# Patient Record
Sex: FEMALE | Race: BLACK OR AFRICAN AMERICAN | Employment: UNEMPLOYED | URBAN - METROPOLITAN AREA
[De-identification: names, ages, dates, MRNs, and addresses within clinical notes are randomized per-mention and may not be internally consistent; named-entity substitution may affect disease eponyms.]

---

## 2019-02-12 ENCOUNTER — OP HISTORICAL/CONVERTED ENCOUNTER (OUTPATIENT)
Dept: OTHER | Age: 34
End: 2019-02-12

## 2019-03-05 ENCOUNTER — OP HISTORICAL/CONVERTED ENCOUNTER (OUTPATIENT)
Dept: OTHER | Age: 34
End: 2019-03-05

## 2019-03-19 ENCOUNTER — OP HISTORICAL/CONVERTED ENCOUNTER (OUTPATIENT)
Dept: OTHER | Age: 34
End: 2019-03-19

## 2019-07-02 ENCOUNTER — OP HISTORICAL/CONVERTED ENCOUNTER (OUTPATIENT)
Dept: OTHER | Age: 34
End: 2019-07-02

## 2020-08-24 VITALS
WEIGHT: 260 LBS | HEIGHT: 64 IN | HEART RATE: 66 BPM | SYSTOLIC BLOOD PRESSURE: 120 MMHG | BODY MASS INDEX: 44.39 KG/M2 | DIASTOLIC BLOOD PRESSURE: 60 MMHG

## 2020-08-24 PROBLEM — E83.51 HYPOCALCEMIA: Status: ACTIVE | Noted: 2020-08-24

## 2020-08-24 PROBLEM — C73 MALIGNANT NEOPLASM OF THYROID GLAND (HCC): Status: ACTIVE | Noted: 2020-08-24

## 2020-08-24 PROBLEM — E89.0 POSTSURGICAL HYPOTHYROIDISM: Status: ACTIVE | Noted: 2020-08-24

## 2020-12-04 RX ORDER — LEVOTHYROXINE SODIUM 175 UG/1
TABLET ORAL
Qty: 30 TAB | Refills: 0 | Status: SHIPPED | OUTPATIENT
Start: 2020-12-04 | End: 2020-12-22

## 2021-01-20 ENCOUNTER — OFFICE VISIT (OUTPATIENT)
Dept: ENDOCRINOLOGY | Age: 36
End: 2021-01-20
Payer: MEDICAID

## 2021-01-20 VITALS
BODY MASS INDEX: 43.06 KG/M2 | HEIGHT: 64 IN | OXYGEN SATURATION: 97 % | TEMPERATURE: 98.7 F | HEART RATE: 81 BPM | DIASTOLIC BLOOD PRESSURE: 69 MMHG | WEIGHT: 252.2 LBS | SYSTOLIC BLOOD PRESSURE: 115 MMHG

## 2021-01-20 DIAGNOSIS — C73 PAPILLARY THYROID CARCINOMA (HCC): Primary | ICD-10-CM

## 2021-01-20 DIAGNOSIS — E55.9 VITAMIN D DEFICIENCY: ICD-10-CM

## 2021-01-20 DIAGNOSIS — E83.51 HYPOCALCEMIA: ICD-10-CM

## 2021-01-20 DIAGNOSIS — E89.0 POSTOPERATIVE HYPOTHYROIDISM: ICD-10-CM

## 2021-01-20 PROCEDURE — 99214 OFFICE O/P EST MOD 30 MIN: CPT | Performed by: INTERNAL MEDICINE

## 2021-01-20 NOTE — LETTER
1/20/2021 Patient: Pedro Abreu YOB: 1985 Date of Visit: 1/20/2021 Rahel Mariano MD 
[address] [place] [address],[phone] Via Fax: [phone] Dear Rahel Mariano MD, Thank you for referring Ms. Saadia Guy to [address] [place] [address] for evaluation. My notes for this consultation are attached. If you have questions, please do not hesitate to call me. I look forward to following your patient along with you. Sincerely, Marina Raymond MD

## 2021-01-20 NOTE — PROGRESS NOTES
History and Physical    Patient: Jennifer Chavez MRN: [id number]  SSN: [id number]    YOB: 1985  Age: 39 y.o. Sex: female      Subjective:      Jennifer Chavez is a 39 y.o. female with no significant past medical history is here for follow-up of papillary thyroid carcinoma. She remains in primary care of Dr. Abigail Estevez. She is also in care of ENT surgeon Dr. Josh Cruz.    Was last seen in December 2019. papillary thyroid carcinoma: At the previous visit it was discussed based on patient's lab that she has biochemical incomplete response. We planned to continue with monitoring thyroglobulin panel every 6 months while keeping TSH suppressed. Thyroglobulin panel was checked in December 2019 and although thyroglobulin was still detectable it was trending down. Last neck ultrasound was in March 2020 and it did not show any remanence in thyroid fossa. Today patient denies any difficulty in swallowing, hoarseness of voice. Sometimes she feels a small bump on the left side of her neck in thyroid region. Postoperative hypothyroidism:  Currently on levothyroxine 175 µg daily. She is taking this regularly and appropriately. She is complaining of cold intolerance, excessive sweating, fatigue, sometimes she gets palpitations and tremors. she feels like she has difficulty in losing weight. She had anxiety attack a few months back, which was new for her. Menses are regular but very heavy. Hypocalcemia:  Combination of vitamin D deficiency and some postoperative hypoparathyroidism. At the last visit patient was given high dose vitamin D replacement. She took 50,000 units once a week for 12 weeks and 2000 units once a day until one month back and then she stopped taking it. She was advised to take 2 calcium tablets every day but she is not taking this because it upsets her stomach.   She is describing symptoms of hypocalcemia including tingling in her fingertips, fatigue and muscle spasms. Initial history:  Patient noticed a lump on her thyroid a few months back. She went to primary care physician who did imaging that showed thyroid nodules. Following this she was referred to ENT. Patient had CT scan that showed multinodular goiter with compression of trachea. Patient did not have biopsy of thyroid nodules and she was taken straight for thyroidectomy. She had surgery on 3192019. she tolerated the surgery well. pathology came back showing A thyroid cancer, so patient is sent to me for further management. Family history of breast cancer in patient's aunt. Family history of thyroidectomy in patient's aunt, not clear if it was for cancer or just goiter. Past Medical History:   Diagnosis Date    Hypocalcemia 8/24/2020    Malignant neoplasm of thyroid gland ([address] 8/24/2020    Postsurgical hypothyroidism 8/24/2020     Past Surgical History:   Procedure Laterality Date    HX THYROIDECTOMY        Family History   Problem Relation Age of Onset    Lupus Mother     No Known Problems Father      Social History     Tobacco Use    Smoking status: Current Every Day Smoker     Packs/day: 1.00     Years: 0.00     Pack years: 0.00    Smokeless tobacco: Never Used   Substance Use Topics    Alcohol use: Yes      Prior to Admission medications    Medication Sig Start Date End Date Taking? Authorizing Provider   levothyroxine (SYNTHROID) 175 mcg tablet TAKE 1 TABLET BY MOUTH EVERY DAY IN THE MORNING 12/22/20  Yes Khris Ramey MD        No Known Allergies    Review of Systems:  ROS    A comprehensive review of systems was preformed and it is negative except mentioned in HPI    Objective:     Vitals:    01/20/21 1105   BP: 115/69   Pulse: 81   Temp: 98.7 °F (37.1 °C)   TempSrc: Temporal   SpO2: 97%   Weight: 252 lb 3.2 oz (114.4 kg)   Height: 5' 4\" (1.626 m)        Physical Exam:    Physical Exam  Constitutional:       Appearance: She is obese. HENT:      Head: Normocephalic and atraumatic. Neck:      Musculoskeletal: Normal range of motion. Comments: I did not feel any enlarged lymph nodes in her neck  Cardiovascular:      Rate and Rhythm: Normal rate and regular rhythm. Pulmonary:      Effort: Pulmonary effort is normal.      Breath sounds: Normal breath sounds. Neurological:      Mental Status: She is alert. Labs and Imaging:    Last 3 Recorded Weights in this Encounter    01/20/21 1105   Weight: 252 lb 3.2 oz (114.4 kg)        No results found for: HBA1C, HGBE8, SYF4ECEA, MHD4ZEGF, YIN1NATS     Assessment:     Patient Active Problem List   Diagnosis Code    Malignant neoplasm of thyroid gland ([address] C73    Postsurgical hypothyroidism E89.0    Hypocalcemia E83.51    Papillary thyroid carcinoma ([facility] C73    Postoperative hypothyroidism E89.0    Vitamin D deficiency E55.9           Plan:     papillary thyroid carcinoma  thyroid cancer noted in surgical pathology done for thyroidectomy for multinodular goiter on 1944693. site: Isthmus  Greatest diameter: 2.6 cm  Distance to closest margin: 1 mm  Well encapsulated  No angioinvasion  Lymph nodes not sampled  Type: papillary thyroid carcinoma with follicular variant, encapsulated. labs from 929227:  TSH suppressed at 0.254  Free T4 elevated at 2.07  thyroglobulin antibody undetectable  Thyroglobulin deductible at 1.8    ultrasound neck 464112:  No evidence of residual thyroid tissue. Multiple enlarged lymph nodes, some pathologically enlarged but has fatty curt. Largest lymph node is in the left submandibular region 2.3×1.6 cm.    patient appears to have biochemically incomplete response. 21530754:  Thyroglobulin detectable but lower than before and 0.3  Thyroglobulin antibody undetectable  TSH suppressed at 0.164  Free T4 normal at 1.49    Neck ultrasound 3-5-2020:  No masses in thyroid fossa. Plan:  Repeat thyroglobulin panel and neck ultrasound.     postoperative hypothyroidism  levothyroxine increased from 100 µg to 175 µg on 4348468. Labs from 648557 showed TSH suppressed at 0.254  Free T4 elevated at 2.07  90639249:  TSH suppressed at 0.164  Free T4 normal at 1.49  plan:  Continue current dose of levothyroxine to keep TSH suppressed between 0.1-0.5. Check thyroid function test today. hypocalcemia  calcium was low at 8.6 on 0819354, PTH upper end of normal range at 60, phosphorus normal at 2.9, vitamin D low at 6.3.  patient is not taking vitamin D R calcium currently. WWJZ257737:  Calcium again borderline low at 8.5. Patient is symptomatic with tingling in her fingers. She is not taking calcium or vitamin D. Plan:  reinforced to patient importance of taking calcium tablet twice a day. Check CMP, PTH, magnesium, phosphorus and vitamin D level today. After I review her labs I will send her prescriptions. vitamin D deficiency  25 hydroxy vitamin D low at 6.3. she is status post high-dose replacement a few months back. She is not taking maintenance 2000 units every day. Check vitamin D level today. Orders Placed This Encounter    US THYROID/PARATHYROID/SOFT TISS     Standing Status:   Future     Standing Expiration Date:   2/20/2022     Order Specific Question:   Is Patient Pregnant?      Answer:   Unknown     Order Specific Question:   Reason for Exam     Answer:   thyroid cancer follow up    TSH AND FREE T4    METABOLIC PANEL, COMPREHENSIVE    PTH INTACT    PHOSPHORUS    MAGNESIUM    THYROID ANTIBODY PANEL        Signed By: Jesusita Brooke MD     January 20, 2021      Return to clinic 1 month

## 2021-01-21 ENCOUNTER — TELEPHONE (OUTPATIENT)
Dept: ENDOCRINOLOGY | Age: 36
End: 2021-01-21

## 2021-01-21 LAB
ALBUMIN SERPL-MCNC: 3.8 G/DL (ref 3.8–4.8)
ALBUMIN/GLOB SERPL: 1.4 {RATIO} (ref 1.2–2.2)
ALP SERPL-CCNC: 74 IU/L (ref 39–117)
ALT SERPL-CCNC: 10 IU/L (ref 0–32)
AST SERPL-CCNC: 14 IU/L (ref 0–40)
BILIRUB SERPL-MCNC: <0.2 MG/DL (ref 0–1.2)
BUN SERPL-MCNC: 13 MG/DL (ref 6–20)
BUN/CREAT SERPL: 16 (ref 9–23)
CALCIUM SERPL-MCNC: 8.9 MG/DL (ref 8.7–10.2)
CHLORIDE SERPL-SCNC: 105 MMOL/L (ref 96–106)
CO2 SERPL-SCNC: 25 MMOL/L (ref 20–29)
CREAT SERPL-MCNC: 0.81 MG/DL (ref 0.57–1)
GLOBULIN SER CALC-MCNC: 2.8 G/DL (ref 1.5–4.5)
GLUCOSE SERPL-MCNC: 86 MG/DL (ref 65–99)
MAGNESIUM SERPL-MCNC: 1.7 MG/DL (ref 1.6–2.3)
PHOSPHATE SERPL-MCNC: 3.1 MG/DL (ref 3–4.3)
POTASSIUM SERPL-SCNC: 4.5 MMOL/L (ref 3.5–5.2)
PROT SERPL-MCNC: 6.6 G/DL (ref 6–8.5)
PTH-INTACT SERPL-MCNC: 40 PG/ML (ref 15–65)
SODIUM SERPL-SCNC: 139 MMOL/L (ref 134–144)
T4 FREE SERPL-MCNC: 1.68 NG/DL (ref 0.82–1.77)
THYROGLOB AB SERPL-ACNC: <1 IU/ML (ref 0–0.9)
THYROPEROXIDASE AB SERPL-ACNC: <9 IU/ML (ref 0–34)
TSH SERPL DL<=0.005 MIU/L-ACNC: 0.04 UIU/ML (ref 0.45–4.5)

## 2021-01-21 NOTE — TELEPHONE ENCOUNTER
Please check with [facility] if they can add the following test to the sample that was collected yesterday:  Thyroglobulin  Test #[id number]

## 2021-01-21 NOTE — TELEPHONE ENCOUNTER
I called [employer] and spoke with Brandan Colunga and he will add the test and will fax to us an authorization form to sign. If they are unable to run the test from the sample that was sent they will notify us.

## 2021-01-29 LAB
SPECIMEN STATUS REPORT, ROLRST: NORMAL
THYROGLOB AB SERPL-ACNC: <1 IU/ML
THYROGLOB SERPL-MCNC: 0.3 NG/ML
THYROGLOB SERPL-MCNC: NORMAL NG/ML

## 2021-04-12 RX ORDER — LEVOTHYROXINE SODIUM 175 UG/1
TABLET ORAL
Qty: 30 TAB | Refills: 1 | Status: SHIPPED | OUTPATIENT
Start: 2021-04-12 | End: 2021-08-16 | Stop reason: SDUPTHER

## 2021-08-12 ENCOUNTER — TELEPHONE (OUTPATIENT)
Dept: ENDOCRINOLOGY | Age: 36
End: 2021-08-12

## 2021-08-12 DIAGNOSIS — E55.9 VITAMIN D DEFICIENCY: Primary | ICD-10-CM

## 2021-08-12 DIAGNOSIS — E89.0 POSTOPERATIVE HYPOTHYROIDISM: ICD-10-CM

## 2021-08-12 NOTE — TELEPHONE ENCOUNTER
Pt called to schedule an appt. We gave her September 3rd. Pt says that she will not have enough medication to last until Sept 3. I told her that you may refill her prescription(s) for 30 days and then give her the 90-day script(s) when she comes in. She was okay with that.

## 2021-08-16 RX ORDER — LEVOTHYROXINE SODIUM 175 UG/1
175 TABLET ORAL
Qty: 30 TABLET | Refills: 0 | Status: SHIPPED | OUTPATIENT
Start: 2021-08-16 | End: 2021-09-15

## 2021-08-16 RX ORDER — ACETAMINOPHEN 500 MG
2000 TABLET ORAL DAILY
Qty: 90 CAPSULE | Refills: 1 | Status: SHIPPED | OUTPATIENT
Start: 2021-08-16 | End: 2021-11-14

## 2021-08-16 NOTE — TELEPHONE ENCOUNTER
I was not sure which pharmacy she wanted me to send her medications to. I sent it to [facility].  Please check if that is okay.

## 2021-11-03 ENCOUNTER — TELEPHONE (OUTPATIENT)
Dept: ENDOCRINOLOGY | Age: 36
End: 2021-11-03

## 2021-11-03 DIAGNOSIS — E89.0 POSTOPERATIVE HYPOTHYROIDISM: Primary | ICD-10-CM

## 2021-11-03 RX ORDER — LEVOTHYROXINE SODIUM 175 UG/1
175 TABLET ORAL DAILY
Qty: 90 TABLET | Refills: 1 | Status: SHIPPED | OUTPATIENT
Start: 2021-11-03 | End: 2021-12-22 | Stop reason: SDUPTHER

## 2021-11-03 NOTE — TELEPHONE ENCOUNTER
Patient has had covid two times and she needs a refill on her levothyroxine medication. She is completely out of her medication.

## 2021-12-22 ENCOUNTER — TELEPHONE (OUTPATIENT)
Dept: ENDOCRINOLOGY | Age: 36
End: 2021-12-22

## 2021-12-22 DIAGNOSIS — E89.0 POSTOPERATIVE HYPOTHYROIDISM: ICD-10-CM

## 2021-12-22 RX ORDER — LEVOTHYROXINE SODIUM 175 UG/1
175 TABLET ORAL DAILY
Qty: 90 TABLET | Refills: 1 | Status: SHIPPED | OUTPATIENT
Start: 2021-12-22 | End: 2022-03-22

## 2021-12-22 NOTE — TELEPHONE ENCOUNTER
Patient needs a refill on her levothyroxine she has not had an appointment since January, informed the patient there are openings today stated unable to come today.  Patient's next appt is scheduled for jan 27th @ 1:45PM.

## 2022-03-18 PROBLEM — E89.0 POSTSURGICAL HYPOTHYROIDISM: Status: ACTIVE | Noted: 2020-08-24

## 2022-03-19 PROBLEM — E83.51 HYPOCALCEMIA: Status: ACTIVE | Noted: 2020-08-24

## 2022-03-19 PROBLEM — E89.0 POSTOPERATIVE HYPOTHYROIDISM: Status: ACTIVE | Noted: 2021-01-20

## 2022-03-19 PROBLEM — E55.9 VITAMIN D DEFICIENCY: Status: ACTIVE | Noted: 2021-01-20

## 2022-03-19 PROBLEM — C73 MALIGNANT NEOPLASM OF THYROID GLAND (HCC): Status: ACTIVE | Noted: 2020-08-24

## 2022-03-20 PROBLEM — C73 PAPILLARY THYROID CARCINOMA (HCC): Status: ACTIVE | Noted: 2021-01-20

## 2022-04-06 ENCOUNTER — OFFICE VISIT (OUTPATIENT)
Dept: ENDOCRINOLOGY | Age: 37
End: 2022-04-06
Payer: MEDICAID

## 2022-04-06 VITALS
HEIGHT: 64 IN | SYSTOLIC BLOOD PRESSURE: 133 MMHG | BODY MASS INDEX: 43.19 KG/M2 | OXYGEN SATURATION: 93 % | HEART RATE: 90 BPM | WEIGHT: 253 LBS | DIASTOLIC BLOOD PRESSURE: 69 MMHG | RESPIRATION RATE: 19 BRPM | TEMPERATURE: 98 F

## 2022-04-06 DIAGNOSIS — E89.0 POSTOPERATIVE HYPOTHYROIDISM: ICD-10-CM

## 2022-04-06 DIAGNOSIS — E55.9 VITAMIN D DEFICIENCY: ICD-10-CM

## 2022-04-06 DIAGNOSIS — Z85.850 HX OF PAPILLARY THYROID CARCINOMA: Primary | ICD-10-CM

## 2022-04-06 DIAGNOSIS — E83.51 HYPOCALCEMIA: ICD-10-CM

## 2022-04-06 PROCEDURE — 99214 OFFICE O/P EST MOD 30 MIN: CPT | Performed by: INTERNAL MEDICINE

## 2022-04-06 RX ORDER — LEVOTHYROXINE SODIUM 175 UG/1
175 TABLET ORAL
COMMUNITY
End: 2022-04-07 | Stop reason: SDUPTHER

## 2022-04-06 NOTE — PROGRESS NOTES
1. \"Have you been to the ER, urgent care clinic since your last visit? Hospitalized since your last visit? \" No    2. \"Have you seen or consulted any other health care providers outside of the [address] [place] [address] since your last visit? \" No     3. For patients aged 39-70: Has the patient had a colonoscopy / FIT/ Cologuard? NA - based on age      If the patient is female:    4. For patients aged 41-77: Has the patient had a mammogram within the past 2 years? NA - based on age or sex      11. For patients aged 21-65: Has the patient had a pap smear?  No

## 2022-04-06 NOTE — LETTER
4/6/2022    Patient: Klaus Savage   YOB: 1985   Date of Visit: 4/6/2022     Jan Stewart MD  [facility] [phone]  Via Fax: [phone]    Dear Jan Stewart MD,      Thank you for referring Ms. Alissa Smith to [address] [place] [address] ENDOCRINOLOGY for evaluation. My notes for this consultation are attached. If you have questions, please do not hesitate to call me. I look forward to following your patient along with you.       Sincerely,    Carlos Willingham MD

## 2022-04-06 NOTE — PROGRESS NOTES
History and Physical    Patient: Klaus Savage MRN: [id number]  SSN: [id number]    YOB: 1985  Age: 40 y.o. Sex: female      Subjective:      Klaus Savage is a 40 y.o. female with no significant past medical history is here for follow-up of papillary thyroid carcinoma. She remains in primary care of Dr. Alex Xavier. She is also in care of ENT surgeon Dr. Sky Rodríguez.    Was last seen in December 2019. papillary thyroid carcinoma: At the previous visit it was discussed based on patient's lab that she has biochemical incomplete response. We planned to continue with monitoring thyroglobulin panel every 6 months while keeping TSH suppressed. Thyroglobulin panel was checked in December 2019 and although thyroglobulin was still detectable it was trending down, same in January 2021. Last neck ultrasound was in March 2020 and it did not show any remanence in thyroid fossa. I had ordered neck ultrasound to be repeated in January 2021 but patient did not make appointment. I am seeing her after more than 1 year, because she was running out of levothyroxine refills. Today patient denies any difficulty in swallowing, hoarseness of voice. Postoperative hypothyroidism:  Currently on levothyroxine 175 µg daily. She is taking this regularly and appropriately. Weight has been stable since the last visit, bowel movements are regular, energy level is okay, sleep is poor, she is having hot flashes at night, cycles are irregular and heavy. Hypocalcemia:  Combination of vitamin D deficiency and some postoperative hypoparathyroidism. At the last visit patient was given high dose vitamin D replacement. She took 50,000 units once a week for 12 weeks and 2000 units once a day until one month back and then she stopped taking it. She was advised to take 2 calcium tablets every day but she is not taking this because it upsets her stomach. Labs done in January 2021 showed normal calcium and PTH.   Today she denies any symptoms of hypocalcemia. Still not taking calcium or vitamin D. Initial history:  Patient noticed a lump on her thyroid a few months back. She went to primary care physician who did imaging that showed thyroid nodules. Following this she was referred to ENT. Patient had CT scan that showed multinodular goiter with compression of trachea. Patient did not have biopsy of thyroid nodules and she was taken straight for thyroidectomy. She had surgery on 3192019. she tolerated the surgery well. pathology came back showing A thyroid cancer, so patient is sent to me for further management. Family history of breast cancer in patient's aunt. Family history of thyroidectomy in patient's aunt, not clear if it was for cancer or just goiter. Past Medical History:   Diagnosis Date    Hypocalcemia 8/24/2020    Malignant neoplasm of thyroid gland ([address] 8/24/2020    Postsurgical hypothyroidism 8/24/2020     Past Surgical History:   Procedure Laterality Date    HX THYROIDECTOMY        Family History   Problem Relation Age of Onset    Lupus Mother     No Known Problems Father      Social History     Tobacco Use    Smoking status: Current Every Day Smoker     Packs/day: 1.00     Years: 0.00     Pack years: 0.00    Smokeless tobacco: Never Used   Substance Use Topics    Alcohol use: Yes      Prior to Admission medications    Medication Sig Start Date End Date Taking? Authorizing Provider   levothyroxine (SYNTHROID) 175 mcg tablet Take 175 mcg by mouth Daily (before breakfast).    Yes Provider, Historical        No Known Allergies    Review of Systems:  ROS    A comprehensive review of systems was preformed and it is negative except mentioned in HPI    Objective:     Vitals:    04/06/22 1028   BP: 133/69   Pulse: 90   Resp: 19   Temp: 98 °F (36.7 °C)   TempSrc: Oral   SpO2: 93%   Weight: 253 lb (114.8 kg)   Height: 5' 4\" (1.626 m)        Physical Exam:    Physical Exam  Constitutional:       Appearance: She is obese. HENT:      Head: Normocephalic and atraumatic. Neck:      Comments: I did not feel any enlarged lymph nodes in her neck  Cardiovascular:      Rate and Rhythm: Normal rate and regular rhythm. Pulmonary:      Effort: Pulmonary effort is normal.      Breath sounds: Normal breath sounds. Musculoskeletal:      Cervical back: Normal range of motion. Neurological:      Mental Status: She is alert. Labs and Imaging:    Last 3 Recorded Weights in this Encounter    04/06/22 1028   Weight: 253 lb (114.8 kg)        No results found for: HBA1C, VYI5CUDL, IKO7GSMP, MTK1EGQY     Assessment:     Patient Active Problem List   Diagnosis Code    Malignant neoplasm of thyroid gland ([address] C73    Postsurgical hypothyroidism E89.0    Hypocalcemia E83.51    Papillary thyroid carcinoma ([address] C73    Postoperative hypothyroidism E89.0    Vitamin D deficiency E55.9           Plan:     papillary thyroid carcinoma  thyroid cancer noted in surgical pathology done for thyroidectomy for multinodular goiter on 1941341. site: Isthmus  Greatest diameter: 2.6 cm  Distance to closest margin: 1 mm  Well encapsulated  No angioinvasion  Lymph nodes not sampled  Type: papillary thyroid carcinoma with follicular variant, encapsulated. labs from 562960:  TSH suppressed at 0.254  Free T4 elevated at 2.07  thyroglobulin antibody undetectable  Thyroglobulin deductible at 1.8    ultrasound neck 774961:  No evidence of residual thyroid tissue. Multiple enlarged lymph nodes, some pathologically enlarged but has fatty curt. Largest lymph node is in the left submandibular region 2.3×1.6 cm.    patient appears to have biochemically incomplete response. 52746170:  Thyroglobulin detectable but lower than before and 0.3  Thyroglobulin antibody undetectable  TSH suppressed at 0.164  Free T4 normal at 1.49    Neck ultrasound 3-5-2020:  No masses in thyroid fossa.     1-:  Thyroglobulin antibody undetectable  Thyroglobulin detectable but low at 0.3, similar to what it was in December 2019. TSH suppressed at 0.037  Free T4 normal at 1.68  Patient did not get thyroid ultrasound done. Plan:  Repeat thyroglobulin panel and neck ultrasound. Gave patient phone number to call and make appointment for ultrasound. Further management to be decided based on the result of these labs. I will see her back in 6 months. postoperative hypothyroidism  levothyroxine increased from 100 µg to 175 µg on 2162615. Labs from 058199 showed TSH suppressed at 0.254  Free T4 elevated at 2.07  01882795:  TSH suppressed at 0.164  Free T4 normal at 1.49    1-:  TSH suppressed at 0.037  Free T4 normal at 1.68  Levothyroxine 175 mcg daily was continued  She is having symptoms of hyperthyroidism. plan:  Target TSH is between 0.1 and 0.5. Check TSH today. I will send refill on levothyroxine based on the result.    hypocalcemia  calcium was low at 8.6 on 9974703, PTH upper end of normal range at 60, phosphorus normal at 2.9, vitamin D low at 6.3.  patient is not taking vitamin D R calcium currently. JKPD070317:  Calcium again borderline low at 8.5. Patient is symptomatic with tingling in her fingers. She is not taking calcium or vitamin D.    1-:  Calcium normal at 8.9  PTH normal at 40  Plan: It is possible that postoperative hypoparathyroidism has resolved. Check CMP and PTH today. vitamin D deficiency  25 hydroxy vitamin D low at 6.3. she is status post high-dose replacement a few months back. She is not taking maintenance 2000 units every day. Check vitamin D level today. Orders Placed This Encounter    US HEAD NECK SOFT TISSUE     Standing Status:   Future     Standing Expiration Date:   5/6/2023     Order Specific Question:   Is Patient Pregnant?      Answer:   Unknown    THYROGLOBULIN AB + THYROGLOBULIN    TSH AND FREE T4    VITAMIN D, 25 HYDROXY    METABOLIC PANEL, COMPREHENSIVE Signed By: Rut Lewis MD     April 6, 2022      Return to clinic 6 months

## 2022-04-07 DIAGNOSIS — E55.9 VITAMIN D DEFICIENCY: ICD-10-CM

## 2022-04-07 DIAGNOSIS — E89.0 POSTOPERATIVE HYPOTHYROIDISM: Primary | ICD-10-CM

## 2022-04-07 LAB
25(OH)D3+25(OH)D2 SERPL-MCNC: 5.1 NG/ML (ref 30–100)
ALBUMIN SERPL-MCNC: 4 G/DL (ref 3.8–4.8)
ALBUMIN/GLOB SERPL: 1.4 {RATIO} (ref 1.2–2.2)
ALP SERPL-CCNC: 73 IU/L (ref 44–121)
ALT SERPL-CCNC: 9 IU/L (ref 0–32)
AST SERPL-CCNC: 14 IU/L (ref 0–40)
BILIRUB SERPL-MCNC: <0.2 MG/DL (ref 0–1.2)
BUN SERPL-MCNC: 14 MG/DL (ref 6–20)
BUN/CREAT SERPL: 16 (ref 9–23)
CALCIUM SERPL-MCNC: 8.3 MG/DL (ref 8.7–10.2)
CHLORIDE SERPL-SCNC: 104 MMOL/L (ref 96–106)
CO2 SERPL-SCNC: 20 MMOL/L (ref 20–29)
CREAT SERPL-MCNC: 0.86 MG/DL (ref 0.57–1)
EGFR: 89 ML/MIN/1.73
GLOBULIN SER CALC-MCNC: 2.8 G/DL (ref 1.5–4.5)
GLUCOSE SERPL-MCNC: 93 MG/DL (ref 65–99)
POTASSIUM SERPL-SCNC: 4.2 MMOL/L (ref 3.5–5.2)
PROT SERPL-MCNC: 6.8 G/DL (ref 6–8.5)
SODIUM SERPL-SCNC: 140 MMOL/L (ref 134–144)
T4 FREE SERPL-MCNC: 1.26 NG/DL (ref 0.82–1.77)
THYROGLOB AB SERPL-ACNC: <1 IU/ML (ref 0–0.9)
THYROGLOB SERPL-MCNC: 0.6 NG/ML (ref 1.5–38.5)
TSH SERPL DL<=0.005 MIU/L-ACNC: 6.91 UIU/ML (ref 0.45–4.5)

## 2022-04-07 RX ORDER — CHOLECALCIFEROL (VITAMIN D3) 125 MCG
5000 CAPSULE ORAL DAILY
Qty: 90 CAPSULE | Refills: 2 | Status: SHIPPED | OUTPATIENT
Start: 2022-04-07 | End: 2022-07-06

## 2022-04-07 RX ORDER — LEVOTHYROXINE SODIUM 175 UG/1
175 TABLET ORAL
Qty: 90 TABLET | Refills: 2 | Status: SHIPPED | OUTPATIENT
Start: 2022-04-07 | End: 2022-07-06

## 2022-04-19 ENCOUNTER — HOSPITAL ENCOUNTER (OUTPATIENT)
Dept: ULTRASOUND IMAGING | Age: 37
Discharge: HOME | End: 2022-04-19
Attending: INTERNAL MEDICINE
Payer: MEDICAID

## 2022-04-19 DIAGNOSIS — Z85.850 HX OF PAPILLARY THYROID CARCINOMA: ICD-10-CM

## 2022-04-19 PROCEDURE — 76536 US EXAM OF HEAD AND NECK: CPT
